# Patient Record
Sex: MALE | Race: WHITE | Employment: FULL TIME | URBAN - METROPOLITAN AREA
[De-identification: names, ages, dates, MRNs, and addresses within clinical notes are randomized per-mention and may not be internally consistent; named-entity substitution may affect disease eponyms.]

---

## 2018-02-07 ENCOUNTER — OFFICE VISIT (OUTPATIENT)
Dept: ORTHOPEDIC SURGERY | Age: 48
End: 2018-02-07

## 2018-02-07 VITALS
DIASTOLIC BLOOD PRESSURE: 98 MMHG | HEIGHT: 69 IN | SYSTOLIC BLOOD PRESSURE: 138 MMHG | WEIGHT: 210 LBS | BODY MASS INDEX: 31.1 KG/M2 | HEART RATE: 79 BPM

## 2018-02-07 DIAGNOSIS — M50.30 DDD (DEGENERATIVE DISC DISEASE), CERVICAL: ICD-10-CM

## 2018-02-07 DIAGNOSIS — M79.602 ARM PAIN, LEFT: Primary | ICD-10-CM

## 2018-02-07 DIAGNOSIS — M54.12 CERVICAL RADICULOPATHY: ICD-10-CM

## 2018-02-07 PROCEDURE — 99203 OFFICE O/P NEW LOW 30 MIN: CPT | Performed by: PHYSICIAN ASSISTANT

## 2018-02-07 RX ORDER — METHYLPREDNISOLONE 4 MG/1
TABLET ORAL
Qty: 1 KIT | Refills: 0 | Status: SHIPPED | OUTPATIENT
Start: 2018-02-07 | End: 2018-02-13

## 2018-02-07 RX ORDER — TIZANIDINE 4 MG/1
4 TABLET ORAL 3 TIMES DAILY
Qty: 30 TABLET | Refills: 0 | Status: SHIPPED | OUTPATIENT
Start: 2018-02-07

## 2018-02-07 NOTE — PROGRESS NOTES
by mouth every 6 hours as needed for Pain (Knows not to take prior to surgery). , Disp: , Rfl:   Allergies:  Review of patient's allergies indicates no known allergies. Social History:    reports that he has quit smoking. He has never used smokeless tobacco. He reports that he drinks alcohol. He reports that he does not use drugs. Family History:   Family History   Problem Relation Age of Onset    Adopted: Yes       REVIEW OF SYSTEMS: Full ROS noted & scanned   CONSTITUTIONAL: Denies unexplained weight loss, fevers, chills or fatigue  NEUROLOGICAL: Denies unsteady gait or progressive weakness  MUSCULOSKELETAL: Denies joint swelling or redness  PSYCHOLOGICAL: Denies anxiety, depression   SKIN: Denies skin changes, delayed healing, rash, itching   HEMATOLOGIC: Denies easy bleeding or bruising  ENDOCRINE: Denies excessive thirst, urination, heat/cold  RESPIRATORY: Denies current dyspnea, cough  GI: Denies nausea, vomiting, diarrhea   : Denies bowel or bladder issues       PHYSICAL EXAM:    Vitals: Blood pressure (!) 138/98, pulse 79, height 5' 9\" (1.753 m), weight 210 lb (95.3 kg). GENERAL EXAM:  · General Apparence: Patient is adequately groomed with no evidence of malnutrition. · Orientation: The patient is oriented to time, place and person. · Mood & Affect:The patient's mood and affect are appropriate   · Vascular: Examination reveals no swelling tenderness in upper or lower extremities. Good capillary refill  · Lymphatic: The lymphatic examination bilaterally reveals all areas to be without enlargement or induration  · Sensation: Sensation is intact without deficit  · Coordination/Balance: Good coordination     CERVICAL EXAMINATION:  · Inspection: Local inspection shows no step-off or bruising. Cervical alignment is normal.     · Palpation: There is some tenderness to palpation of the levator scapulae and rhomboids with mild muscle guarding. There is no step-off or paraspinal spasm.    · Range of

## 2018-02-21 ENCOUNTER — HOSPITAL ENCOUNTER (OUTPATIENT)
Dept: PHYSICAL THERAPY | Age: 48
Discharge: HOME | End: 2018-02-28
Admitting: ORTHOPAEDIC SURGERY

## 2018-02-21 NOTE — PLAN OF CARE
this time. Co-morbidities/Complexities (which will affect course of rehabilitation):   [x]None           Arthritic conditions   []Rheumatoid arthritis (M05.9)  []Osteoarthritis (M19.91)   Cardiovascular conditions   []Hypertension (I10)  []Hyperlipidemia (E78.5)  []Angina pectoris (I20)  []Atherosclerosis (I70)  []CVA Musculoskeletal conditions   []Disc pathology   []Congenital spine pathologies   []Prior surgical intervention  []Osteoporosis (M81.8)  []Osteopenia (M85.8)   Endocrine conditions   []Hypothyroid (E03.9)  []Hyperthyroid Gastrointestinal conditions   []Constipation (F67.83)   Metabolic conditions   []Morbid obesity (E66.01)  []Diabetes type 1(E10.65) or 2 (E11.65)   []Neuropathy (G60.9)     Pulmonary conditions   []Asthma (J45)  []Coughing   []COPD (J44.9)   Psychological Disorders  []Anxiety (F41.9)  []Depression (F32.9)   []Other:   []Other:          Barriers to/and or personal factors that will affect rehab potential:              []Age  []Sex   []Smoker              []Motivation/Lack of Motivation                        []Co-Morbidities              []Cognitive Function, education/learning barriers              []Environmental, home barriers              [x]profession/work barriers  []past PT/medical experience  []other:  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 [] Falls Risk assessed and Patient requires intervention due to being higher risk   TUG score (>12s at risk):     [] Falls education provided, including       G-Codes:  PT G-Codes  Functional Assessment Tool Used: NDI  Score: 8%  Functional Limitation: Changing and maintaining body position  Changing and Maintaining Body Position Current Status ():  At least 1 percent but less than 20 percent impaired, limited or restricted  Changing and Maintaining Body Position Goal Status (): 0 percent impaired, limited or restricted    ASSESSMENT: Patient demonstrates  decreased motion and strength in his cervical and thoracic spine, effecting his C7 dermatome and myotome, limiting his ability to complete ADLs and work without pain. Will benefit from skilled PT to address limitations. Functional Impairments:     [x]Noted cervical/thoracic/GHJ joint hypomobility   []Noted cervical/thoracic/GHJ joint hypermobility   [x]Decreased cervical/UE functional ROM   []Noted Headache pain aggravated by neck movements with/without dizziness   []Abnormal reflexes/sensation/myotomal/dermatomal deficits   [x]Decreased DCF control or ability to hold head up   [x]Decreased RC/scapular/core strength and neuromuscular control    [x]Decreased UE functional strength   []other:      Functional Activity Limitations (from functional questionnaire and intake)   []Reduced ability to tolerate prolonged functional positions   []Reduced ability or difficulty with changes of positions or transfers between positions   [x]Reduced ability to maintain good posture and demonstrate good body mechanics with sitting, bending, and lifting   [] Reduced ability or tolerance with driving and/or computer work   [x]Reduced ability to perform lifting, reaching, carrying tasks   []Reduced ability to concentrate   [x]Reduced ability to sleep    []Reduced ability to tolerate any impact through UE or spine   []Reduced ability to ambulate prolonged functional periods/distances   []other:    Participation Restrictions   []Reduced participation in self care activities   []Reduced participation in home management activities   [x]Reduced participation in work activities   []Reduced participation in social activities. []Reduced participation in sport/recreational activities.     Classification/Subgrouping:   []signs/symptoms consistent with neck pain with mobility deficits     []signs/symptoms consistent with neck pain with movement coordinated impairments    []signs/symptoms consistent with neck pain with radiating pain    []signs/symptoms consistent with neck pain with headaches (cervicogenic)    [x]Signs/symptoms consistent with nerve root involvement including myotome & dermatome dysfunction   []sign/symptoms consistent with facet dysfunction of cervical and thoracic spine    []signs/symptoms consistent suggesting central cord compression/UMN syndromes   []signs/symptoms consistent with discogenic cervical pain   []signs/symptoms consistent with rib dysfunction   []signs/symptoms consistent with postural dysfunction   []signs/symptoms consistent with shoulder pathology    []signs/symptoms consistent with post-surgical status including decreased ROM, strength and function.    [x]signs/symptoms consistent with pathology which may benefit from Dry Needling   []signs/symptoms which may limit the use of advanced manual therapy techniques: (Elevated CV risk profile, recent trauma, intolerance to end range positions, prior TIA, visual issues, UE neurological compromise )     Prognosis/Rehab Potential:      []Excellent   []Good    [x]Fair   []Poor    Tolerance of evaluation/treatment:    []Excellent   []Good    [x]Fair   []Poor    Physical Therapy Evaluation Complexity Justification  [x] A history of present problem with:  [x] no personal factors and/or comorbidities that impact the plan of care;  []1-2 personal factors and/or comorbidities that impact the plan of care  []3 personal factors and/or comorbidities that impact the plan of care  [x] An examination of body systems using standardized tests and measures addressing any of the following: body structures and functions (impairments), activity limitations, and/or participation restrictions;:  [x] a total of 1-2 or more elements   [] a total of 3 or more elements   [] a total of 4 or more elements   [x] A clinical presentation with:  [x] stable and/or uncomplicated characteristics   [] evolving clinical presentation with changing

## 2018-02-21 NOTE — FLOWSHEET NOTE
improving soft tissue extensibility and allowing for proper ROM for normal function with self care, reaching, carrying, lifting, house/yardwork, driving/computer work    Modalities:  PM/CP x 15' to cervical and thoracic spine  Charges:  Timed Code Treatment Minutes: 27'   Total Treatment Minutes: 54'     [x] EVAL (LOW) 13662 (typically 20 minutes face-to-face)  [] EVAL (MOD) 61122 (typically 30 minutes face-to-face)  [] EVAL (HIGH) 31282 (typically 45 minutes face-to-face)  [] RE-EVAL     [x] FT(94860) x  1   [] IONTO  [] NMR (73149) x      [] VASO  [x] Manual (12165) x  1    [] Other:  [] TA x       [] Mech Traction (38881)  [] ES(attended) (09092)      [x] ES (un) (31671):     GOALS:  Short Term Goals: To be achieved in: 2 weeks  1. Independent in HEP and progression per patient tolerance, in order to prevent re-injury. 2. Patient will have a decrease in pain to facilitate improvement in movement, function, and ADLs as indicated by Functional Deficits.     Long Term Goals: To be achieved in: 6 weeks  1. Disability index score of 2% or less for the NDI to assist with reaching prior level of function. 2. Patient will demonstrate painfree AROM WFL of cervical/thoracic spine to allow for proper joint functioning as indicated by patients Functional Deficits. 3. Patient will demonstrate an increase in postural awareness and control and activation of  Deep cervical stabilizers to allow for proper functional mobility as indicated by patients Functional Deficits. 4. Patient will be able to sleep through the night without increased symptoms or restriction. 5. Patient will have an absence of radiating pain down his arm.(patient specific functional goal)      Progression Towards Functional goals:  [] Patient is progressing as expected towards functional goals listed. [] Progression is slowed due to complexities listed. [] Progression has been slowed due to co-morbidities.   [x] Plan just implemented, too soon to

## 2018-02-26 ENCOUNTER — HOSPITAL ENCOUNTER (OUTPATIENT)
Dept: PHYSICAL THERAPY | Age: 48
Discharge: HOME | End: 2018-02-27
Admitting: ORTHOPAEDIC SURGERY

## 2018-02-26 NOTE — FLOWSHEET NOTE
[facility], [address] [place] [address] [place]  Phone: [phone]  Fax [phone]      Physical Therapy Daily Treatment Note  Date:  2018    Patient Name:  Miya Turner    :  1970  MRN: [id number]  Restrictions/Precautions:    Medical/Treatment Diagnosis Information:  · Diagnosis: Cervical DDD (M50.30), cervical radiculopathy (M54.12)  · Treatment Diagnosis: Neck pain (Z71.9)  Insurance/Certification information:  PT Insurance Information: [place]  Physician Information:  Referring Practitioner: Danny Olguin of care signed (Y/N):     Date of Patient follow up with Physician:     G-Code (if applicable):      Date G-Code Applied:    PT G-Codes  Functional Assessment Tool Used: NDI  Score: 8%  Functional Limitation: Changing and maintaining body position  Changing and Maintaining Body Position Current Status (): At least 1 percent but less than 20 percent impaired, limited or restricted  Changing and Maintaining Body Position Goal Status (): 0 percent impaired, limited or restricted    Progress Note: [x]  Yes  []  No  Next due by: Visit #10      Latex Allergy:  [x]NO      []YES  Preferred Language for Healthcare:   [x]English       []other:    Visit # Insurance Allowable Requires auth   2 30    [x]no        []yes:     Pain level:  210     SUBJECTIVE:  Patient reports his neck and shoulder seem to be a little better. Pain comes and goes but he feels that the exercises are helping.      OBJECTIVE:   Observation: decreased mobility throughout entire T spine  Test measurements:  NT this date    RESTRICTIONS/PRECAUTIONS: None    Exercises/Interventions:   Therapeutic Ex Sets/sec Reps Notes   HEP   Supine chin tucks 3\" 20x Cueing needed   Upper trap stretch 30\" 3 left only HEP   Levator stretch 30\" 3 left only    Doorway pec stretch  30\"  3 HEP   No $ 3\" 20x HEP                           Finisher Ladders  Finisher

## 2018-03-01 ENCOUNTER — HOSPITAL ENCOUNTER (OUTPATIENT)
Dept: PHYSICAL THERAPY | Age: 48
Discharge: HOME | End: 2018-03-31
Attending: ORTHOPAEDIC SURGERY | Admitting: ORTHOPAEDIC SURGERY

## 2018-03-05 ENCOUNTER — HOSPITAL ENCOUNTER (OUTPATIENT)
Dept: PHYSICAL THERAPY | Age: 48
Discharge: HOME | End: 2018-03-06
Admitting: ORTHOPAEDIC SURGERY

## 2018-03-05 NOTE — FLOWSHEET NOTE
[facility], [address] [place]  Phone: [phone]  Fax [phone]      Physical Therapy Daily Treatment Note  Date:  3/5/2018    Patient Name:  Jerzy Drake    :  1970  MRN: [id number]  Restrictions/Precautions:    Medical/Treatment Diagnosis Information:  · Diagnosis: Cervical DDD (M50.30), cervical radiculopathy (M54.12)  · Treatment Diagnosis: Neck pain (K67.6)  Insurance/Certification information:  PT Insurance Information: [place]  Physician Information:  Referring Practitioner: Modesta Maravilla of care signed (Y/N):     Date of Patient follow up with Physician:     G-Code (if applicable):      Date G-Code Applied:    PT G-Codes  Functional Assessment Tool Used: NDI  Score: 8%  Functional Limitation: Changing and maintaining body position  Changing and Maintaining Body Position Current Status (): At least 1 percent but less than 20 percent impaired, limited or restricted  Changing and Maintaining Body Position Goal Status (): 0 percent impaired, limited or restricted    Progress Note: [x]  Yes  []  No  Next due by: Visit #10      Latex Allergy:  [x]NO      []YES  Preferred Language for Healthcare:   [x]English       []other:    Visit # Insurance Allowable Requires auth   4 30    [x]no        []yes:     Pain level:  0/10     SUBJECTIVE:  Patient states his neck and arm continue to be pain free. Has been doing exercises.      OBJECTIVE:   Observation: Stiff T-spine  Test measurements:  NT this date    RESTRICTIONS/PRECAUTIONS: None    Exercises/Interventions:   Therapeutic Ex Sets/sec Reps Notes   HEP   Supine chin tucks 3\" 15x Cueing needed   Supine chick tuck with lift 1 10    Supine chin tuck with BUE flex 1 10    HEP   HEP   HEP   No $ 3\" 20x HEP         Chair t-spine ext 10x 5\"                Finisher Ladders  Finisher 1/2 arc 3\" 15x ea    Matt stretch upper back 20\" 5 Provided manual therapy to mobilize soft tissue/joints of cervical/CT, scapular GHJ and UE for the purpose of decreasing headache, modulating pain, promoting relaxation,  increasing ROM, reducing/eliminating soft tissue swelling/inflammation/restriction, improving soft tissue extensibility and allowing for proper ROM for normal function with self care, reaching, carrying, lifting, house/yardwork, driving/computer work    Modalities:  PM/CP x 15' to cervical and thoracic spine  Charges:  Timed Code Treatment Minutes: 39'   Total Treatment Minutes: 61'     [] EVAL (LOW) 91602 (typically 20 minutes face-to-face)  [] EVAL (MOD) 28652 (typically 30 minutes face-to-face)  [] EVAL (HIGH) 08033 (typically 45 minutes face-to-face)  [] RE-EVAL     [x] CW(10127) x  1   [] IONTO  [] NMR (82503) x      [] VASO  [x] Manual (07443) x  1    [] Other:  [] TA x       [x] Mech Traction (07519)  [] ES(attended) (74808)      [x] ES (un) (78535):     GOALS:  Short Term Goals: To be achieved in: 2 weeks  1. Independent in HEP and progression per patient tolerance, in order to prevent re-injury. 2. Patient will have a decrease in pain to facilitate improvement in movement, function, and ADLs as indicated by Functional Deficits.     Long Term Goals: To be achieved in: 6 weeks  1. Disability index score of 2% or less for the NDI to assist with reaching prior level of function. 2. Patient will demonstrate painfree AROM WFL of cervical/thoracic spine to allow for proper joint functioning as indicated by patients Functional Deficits. 3. Patient will demonstrate an increase in postural awareness and control and activation of  Deep cervical stabilizers to allow for proper functional mobility as indicated by patients Functional Deficits. 4. Patient will be able to sleep through the night without increased symptoms or restriction.    5. Patient will have an absence of radiating pain down his arm.(patient specific functional goal)

## 2018-03-07 ENCOUNTER — HOSPITAL ENCOUNTER (OUTPATIENT)
Dept: PHYSICAL THERAPY | Age: 48
Discharge: HOME | End: 2018-03-08
Admitting: ORTHOPAEDIC SURGERY

## 2018-03-07 NOTE — FLOWSHEET NOTE
[facility], [address]  Phone: [phone]  Fax [phone]      Physical Therapy Daily Treatment Note  Date:  3/7/2018    Patient Name:  Elizabeth Avilez    :  1970  MRN: [id number]  Restrictions/Precautions:    Medical/Treatment Diagnosis Information:  · Diagnosis: Cervical DDD (M50.30), cervical radiculopathy (M54.12)  · Treatment Diagnosis: Neck pain (H02.2)  Insurance/Certification information:  PT Insurance Information: [place]  Physician Information:  Referring Practitioner: Chiara Roles of care signed (Y/N):     Date of Patient follow up with Physician:     G-Code (if applicable):      Date G-Code Applied:    PT G-Codes  Functional Assessment Tool Used: NDI  Score: 8%  Functional Limitation: Changing and maintaining body position  Changing and Maintaining Body Position Current Status (): At least 1 percent but less than 20 percent impaired, limited or restricted  Changing and Maintaining Body Position Goal Status (): 0 percent impaired, limited or restricted    Progress Note: [x]  Yes  []  No  Next due by: Visit #10      Latex Allergy:  [x]NO      []YES  Preferred Language for Healthcare:   [x]English       []other:    Visit # Insurance Allowable Requires auth   5 30    [x]no        []yes:     Pain level:  0/10     SUBJECTIVE:  Patient states he feels a little stiff, but still no pain in his arm or shoulder blade.      OBJECTIVE:   Observation: Stiff T-spine  Test measurements:  NT this date    RESTRICTIONS/PRECAUTIONS: None    Exercises/Interventions:   Therapeutic Ex Sets/sec Reps Notes   HEP   Supine chin tucks 3\" 15x Cueing needed   Supine chick tuck with lift 1 10       HEP   HEP   HEP   No $ 3\" 20x HEP         Chair t-spine ext 10x 5\"                Finisher Ladders  Finisher 1/2 arc 3\" 15x ea    Matt stretch upper back 20\" 5          TB rows  TB ext 2  2 10  [place] cervical/CT, scapular GHJ and UE for the purpose of decreasing headache, modulating pain, promoting relaxation,  increasing ROM, reducing/eliminating soft tissue swelling/inflammation/restriction, improving soft tissue extensibility and allowing for proper ROM for normal function with self care, reaching, carrying, lifting, house/yardwork, driving/computer work    Modalities:  PM/CP x 15' to cervical and thoracic spine  Charges:  Timed Code Treatment Minutes: 38'   Total Treatment Minutes: 48'     [] EVAL (LOW) 15515 (typically 20 minutes face-to-face)  [] EVAL (MOD) 74292 (typically 30 minutes face-to-face)  [] EVAL (HIGH) 88146 (typically 45 minutes face-to-face)  [] RE-EVAL     [x] QK(06651) x  2   [] IONTO  [] NMR (01300) x      [] VASO  [x] Manual (31536) x  1    [] Other:  [] TA x       [] Mech Traction (83062)  [] ES(attended) (35308)      [x] ES (un) (17070):     GOALS:  Short Term Goals: To be achieved in: 2 weeks  1. Independent in HEP and progression per patient tolerance, in order to prevent re-injury. 2. Patient will have a decrease in pain to facilitate improvement in movement, function, and ADLs as indicated by Functional Deficits.     Long Term Goals: To be achieved in: 6 weeks  1. Disability index score of 2% or less for the NDI to assist with reaching prior level of function. 2. Patient will demonstrate painfree AROM WFL of cervical/thoracic spine to allow for proper joint functioning as indicated by patients Functional Deficits. 3. Patient will demonstrate an increase in postural awareness and control and activation of  Deep cervical stabilizers to allow for proper functional mobility as indicated by patients Functional Deficits. 4. Patient will be able to sleep through the night without increased symptoms or restriction.    5. Patient will have an absence of radiating pain down his arm.(patient specific functional goal)      Progression Towards Functional goals:  [x] Patient is progressing

## 2018-03-12 ENCOUNTER — HOSPITAL ENCOUNTER (OUTPATIENT)
Dept: PHYSICAL THERAPY | Age: 48
Discharge: HOME | End: 2018-03-13
Admitting: ORTHOPAEDIC SURGERY

## 2018-03-12 NOTE — FLOWSHEET NOTE
[facility], [address] [place]  Phone: [phone]  Fax [phone]      Physical Therapy Daily Treatment Note  Date:  3/12/2018    Patient Name:  Sushma Lubin    :  1970  MRN: [id number]  Restrictions/Precautions:    Medical/Treatment Diagnosis Information:  · Diagnosis: Cervical DDD (M50.30), cervical radiculopathy (M54.12)  · Treatment Diagnosis: Neck pain (U06.8)  Insurance/Certification information:  PT Insurance Information: [place]  Physician Information:  Referring Practitioner: Fermin Nguyens of care signed (Y/N):     Date of Patient follow up with Physician:     G-Code (if applicable):      Date G-Code Applied:    PT G-Codes  Functional Assessment Tool Used: NDI  Score: 8%  Functional Limitation: Changing and maintaining body position  Changing and Maintaining Body Position Current Status (): At least 1 percent but less than 20 percent impaired, limited or restricted  Changing and Maintaining Body Position Goal Status (): 0 percent impaired, limited or restricted    Progress Note: [x]  Yes  []  No  Next due by: Visit #10      Latex Allergy:  [x]NO      []YES  Preferred Language for Healthcare:   [x]English       []other:    Visit # Insurance Allowable Requires auth   6 30    [x]no        []yes:     Pain level:  0/10     SUBJECTIVE:  Patient states he feels a little stiff, but still no pain in his arm or shoulder blade.      OBJECTIVE:   Observation: Stiff T-spine  Test measurements:  NT this date    RESTRICTIONS/PRECAUTIONS: None    Exercises/Interventions:   Therapeutic Ex Sets/sec Reps Notes   HEP   Supine chin tucks 3\" 15x Cueing needed   Supine chick tuck with lift 1 15    Supine chin tuck with BUE flex 1 10    HEP   HEP   HEP   No $ 3\" 20x HEP   Quadruped chin tuck with alternating arms 5\" 10x ea          Chair t-spine ext 10x 5\"                Finisher Ladders  Finisher 1/2 arc 3\" 15x ea 4# pain down his arm.(patient specific functional goal)  Met    Progression Towards Functional goals:  [x] Patient is progressing as expected towards functional goals listed. [] Progression is slowed due to complexities listed. [] Progression has been slowed due to co-morbidities. [] Plan just implemented, too soon to assess goals progression  [] Other:     ASSESSMENT:  Patient has demonstrated improvements in motion and postural strength since beginning PT. Has not had any pain in nearly 2 weeks. Will DC with HEP at this time.      Treatment/Activity Tolerance:  [x] Patient tolerated treatment well [] Patient limited by fatique  [] Patient limited by pain  [] Patient limited by other medical complications  [] Other:     Prognosis: [] Good [x] Fair  [] Poor    Patient Requires Follow-up: [] Yes  [x] No    PLAN:   [] Continue per plan of care [] Alter current plan (see comments)  [] Plan of care initiated [] Hold pending MD visit [x] Discharge    Electronically signed by: Domi Rand, PT, DPT [id number]

## 2018-04-01 ENCOUNTER — HOSPITAL ENCOUNTER (OUTPATIENT)
Dept: PHYSICAL THERAPY | Age: 48
Discharge: HOME | End: 2018-04-30
Attending: ORTHOPAEDIC SURGERY | Admitting: ORTHOPAEDIC SURGERY

## 2020-11-19 ENCOUNTER — HOSPITAL ENCOUNTER (EMERGENCY)
Age: 50
Discharge: HOME | End: 2020-11-19
Attending: EMERGENCY MEDICINE
Payer: COMMERCIAL

## 2020-11-19 VITALS
OXYGEN SATURATION: 97 % | SYSTOLIC BLOOD PRESSURE: 151 MMHG | DIASTOLIC BLOOD PRESSURE: 90 MMHG | BODY MASS INDEX: 31.25 KG/M2 | TEMPERATURE: 97.9 F | HEART RATE: 71 BPM | WEIGHT: 211 LBS | RESPIRATION RATE: 18 BRPM | HEIGHT: 69 IN

## 2020-11-19 PROCEDURE — 99284 EMERGENCY DEPT VISIT MOD MDM: CPT

## 2020-11-19 PROCEDURE — 6370000000 HC RX 637 (ALT 250 FOR IP): Performed by: EMERGENCY MEDICINE

## 2020-11-19 RX ORDER — TETRACAINE HYDROCHLORIDE 5 MG/ML
2 SOLUTION OPHTHALMIC ONCE
Status: COMPLETED | OUTPATIENT
Start: 2020-11-19 | End: 2020-11-19

## 2020-11-19 RX ORDER — LISINOPRIL 2.5 MG/1
2.5 TABLET ORAL DAILY
COMMUNITY

## 2020-11-19 RX ADMIN — TETRACAINE HYDROCHLORIDE 2 DROP: 5 SOLUTION OPHTHALMIC at 21:06

## 2020-11-19 ASSESSMENT — PAIN DESCRIPTION - PAIN TYPE: TYPE: ACUTE PAIN

## 2020-11-19 ASSESSMENT — VISUAL ACUITY
OS: 20/200
OD: 20/200
OU: 20/70

## 2020-11-19 ASSESSMENT — PAIN DESCRIPTION - LOCATION: LOCATION: EYE

## 2020-11-19 ASSESSMENT — PAIN SCALES - GENERAL: PAINLEVEL_OUTOF10: 1

## 2020-11-19 NOTE — LETTER
[facility]  ED  [address]  [address]  Phone: [phone]  Fax: [phone]               November 19, 2020    Patient: Caroline Tijerina   YOB: 1970   Date of Visit: 11/19/2020       To Whom It May Concern:    Caroline Tijerina was seen and treated in our emergency department on 11/19/2020. He may return to work on 11/23/20.       Sincerely,       Hollis Weaver DO         Signature:__________________________________
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
## 2020-11-20 NOTE — ED PROVIDER NOTES
CHIEF COMPLAINT  Eye Pain (Patient states 30 minutes PTA he got some battery acid/ corosion in Left eye, flushed with tap water)      HISTORY OF PRESENT ILLNESS  Trudy Carlton is a 51 y.o. male presents to the ED with left eye pain and redness, states about 30 minutes prior to arrival he was changing the battery in his wife's car, and the greenish color corrosive material on the top of the battery flipped up into his left eye, immediately began burning, he had tried to rinse it out as much possible with tap water, but when he told his wife about it she made him come to the ER, he states it is getting more red and irritated, no previous injury like this before, he reports he immediately took his contacts out, and wears glasses but does not have them with him, he states his vision seems as it would normally without his glasses, he does not think he scratched his eye or has any foreign bodies, he reports no recent illness, no fevers or coronavirus exposure that he is aware of, no sharp objects, no headache, no nausea or vomiting. No other complaints, modifying factors or associated symptoms. I have reviewed the following from the nursing documentation. History reviewed. No pertinent past medical history.   Past Surgical History:   Procedure Laterality Date    ANTERIOR CRUCIATE LIGAMENT REPAIR      HERNIA REPAIR      Inguinal    HYDROCELE EXCISION      NASAL SEPTUM SURGERY      TYMPANOSTOMY TUBE PLACEMENT      UMBILICAL HERNIA REPAIR N/A 09/03/2014     Family History   Adopted: Yes     Social History     Socioeconomic History    Marital status:      Spouse name: Not on file    Number of children: Not on file    Years of education: Not on file    Highest education level: Not on file   Occupational History    Not on file   Social Needs    Financial resource strain: Not on file    Food insecurity     Worry: Not on file     Inability: Not on file    Transportation needs     Medical: Not on file Non-medical: Not on file   Tobacco Use    Smoking status: Former Smoker    Smokeless tobacco: Never Used    Tobacco comment: Unable to give quit date   Substance and Sexual Activity    Alcohol use: Yes     Comment: occas    Drug use: No    Sexual activity: Not on file   Lifestyle    Physical activity     Days per week: Not on file     Minutes per session: Not on file    Stress: Not on file   Relationships    Social connections     Talks on phone: Not on file     Gets together: Not on file     Attends Church service: Not on file     Active member of club or organization: Not on file     Attends meetings of clubs or organizations: Not on file     Relationship status: Not on file    Intimate partner violence     Fear of current or ex partner: Not on file     Emotionally abused: Not on file     Physically abused: Not on file     Forced sexual activity: Not on file   Other Topics Concern    Not on file   Social History Narrative    Not on file     No current facility-administered medications for this encounter. Current Outpatient Medications   Medication Sig Dispense Refill    lisinopril (PRINIVIL;ZESTRIL) 2.5 MG tablet Take 2.5 mg by mouth daily      tiZANidine (ZANAFLEX) 4 MG tablet Take 1 tablet by mouth 3 times daily 30 tablet 0    ibuprofen (ADVIL;MOTRIN) 200 MG tablet Take 200 mg by mouth every 6 hours as needed for Pain (Knows not to take prior to surgery). No Known Allergies    REVIEW OF SYSTEMS  10 systems reviewed, pertinent positives per HPI otherwise noted to be negative. PHYSICAL EXAM  BP (!) 151/90   Pulse 71   Temp 97.9 °F (36.6 °C) (Oral)   Resp 18   Ht 5' 9\" (1.753 m)   Wt 211 lb (95.7 kg)   SpO2 97%   BMI 31.16 kg/m²   GENERAL APPEARANCE: Awake and alert. Cooperative. No acute distress  HEAD: Normocephalic. Atraumatic. EYES: PERRL. EOM's grossly intact.   Normal appearing right eye/eyelids, focused on left eye: Significant left eye conjunctival injection with slight chemosis laterally, there is some slight discharge, white sloughing in the inferior eyelid region and medial canthus, no clouding of the cornea, normal appearing iris/pupil, intraocular pressure tested and measured 11, then 10 in the left eye, pH measured by strips at bedside and coloration of the strip with moistening with patient's tears was between 7 and 8 prior to any irrigation, confirmed with nursing at bedside for color assurance, and similar color/between 7 and 8 about 30 minutes after irrigation as well, Woods lamp exam with fluorescein staining without any evidence of foreign bodies, eyelid everted, no evidence of corneal abrasion, no dendritic sign, no Judi sign   ENT: Mucous membranes are moist.  Airway patent, no stridor, no otorrhea or rhinorrhea  NECK: Supple. No rigidity, normal range of motion  HEART: RRR. No murmurs  LUNGS: Respirations nonlabored. Lungs are clear to auscultation bilaterally. ABDOMEN: Soft. Non-distended. Non-tender. No guarding or rebound. Normal bowel sounds. EXTREMITIES: No peripheral edema. Moves all extremities equally. All extremities neurovascularly intact. SKIN: Warm and dry. No acute rashes. NEUROLOGICAL: Alert and oriented. No gross facial drooping. Normal speech, normal gait, no ataxia  PSYCHIATRIC: Normal mood and affect. ED COURSE/MDM  Patient seen and evaluated. Old records reviewed. Labs and imaging reviewed and results discussed with patient.    56-year-old male with left eye chemical conjunctivitis, he had irrigated at home, I flushed at bedside with sterile saline and placed tetracaine drops which helped significantly, also nursing placed [facility] lens for further irrigation of another liter, and patient experienced significant improvement in symptoms, there was still still erythema of the conjunctiva but patient denied any concern for his visual acuity, explained we cannot fully evaluate if he did not have his glasses with him, but he was unable to bring them in during his ED visit, I spoke to Dr. [facility] ophthalmology who made the patient an appointment at 7:30 AM tomorrow morning for recheck. Explained the patient's history and physical exam findings, he reported that redness of the eye was a positive factor, and a white eye would be more concerning, discussed with the patient and he was given specifics/information on discharge instructions, given work excuse to assure that he would make it to the appointment, encouraged to use ibuprofen/Tylenol for pain, but return if any new or worsening symptoms, intraocular pressure was normal, pH was normal before and after irrigation, also encouraged follow-up with primary care about his blood pressure elevation, he was told not to wear his contacts until cleared by the eye doctor, strict return precautions given, all questions answered, will return if any worsening symptoms or new concerns, see AVS for further discharge information, patient verbalized understanding of plan, felt comfortable going home. Orders Placed This Encounter   Procedures    Visual acuity screening    Inpatient consult to Ophthalmology     Orders Placed This Encounter   Medications    tetracaine (TETRAVISC) 0.5 % ophthalmic solution 2 drop     ED Course as of Nov 20 0841   Thu Nov 19, 2020 2111 Perform visual acuity, 20/200 in the left eye, 20/200 in right eye, and 20/70 bilaterally, but he does not have his glasses with him, and had taken his contacts out at home.     [SY]   2230 I spoke to Dr. Herson Nunez ophthalmologist, he stated the patient could follow-up at 7:30 AM at the [facility] office tomorrow morning.    [SY]      ED Course User Index  [SY] Jarek Hernandez DO       CLINICAL IMPRESSION  1. Chemical conjunctivitis of left eye    2. Chemosis of left conjunctiva    3. Essential hypertension        Blood pressure (!) 151/90, pulse 71, temperature 97.9 °F (36.6 °C), temperature source Oral, resp.  rate 18, height 5' 9\" (1.753 m), weight 211 lb (95.7 kg), SpO2 97 %. DISPOSITION  Leatha Stubbs was discharged to home in stable condition.                    Giuliana Romo,   11/21/20 4140

## 2020-11-20 NOTE — ED NOTES
Bed: 22  Expected date:   Expected time:   Means of arrival:   Comments:     Nga Melchor RN  11/19/20 2032

## 2023-05-24 NOTE — H&P
[address]   Pre-operative History and Physical    Patient: Mega Dunlap  : 1970  Acct#:     HISTORY OF PRESENT ILLNESS:    Indications: Positive Cologuard    The patient is a 47 y.o. male with medical history of hypertension referred for positive Cologuard on 4/10/2023. Denies abdominal pain, nausea, vomiting, fever, chills, unintentional weight loss, constipation, diarrhea, hematochezia or melenic stools. No prior colonoscopy. No family history of colon cancer. Past Medical History:        Diagnosis Date    Hypertension       Past Surgical History:        Procedure Laterality Date    ANTERIOR CRUCIATE LIGAMENT REPAIR Right     HERNIA REPAIR      Inguinal    HYDROCELE EXCISION      NASAL SEPTUM SURGERY      TYMPANOSTOMY TUBE PLACEMENT      UMBILICAL HERNIA REPAIR N/A 2014      Medications Prior to Admission:   No current facility-administered medications on file prior to encounter. Current Outpatient Medications on File Prior to Encounter   Medication Sig Dispense Refill    AMLODIPINE BESYLATE PO Take by mouth daily      lisinopril (PRINIVIL;ZESTRIL) 2.5 MG tablet Take 2.5 mg by mouth daily      ibuprofen (ADVIL;MOTRIN) 200 MG tablet Take 200 mg by mouth every 6 hours as needed for Pain (Knows not to take prior to surgery). Allergies:  Patient has no known allergies.     Social History:   Social History     Socioeconomic History    Marital status:      Spouse name: Not on file    Number of children: Not on file    Years of education: Not on file    Highest education level: Not on file   Occupational History    Not on file   Tobacco Use    Smoking status: Former     Types: Cigarettes     Quit date:      Years since quitting: 10.3    Smokeless tobacco: Never    Tobacco comments:     Unable to give quit date   Vaping Use    Vaping Use: Never used   Substance and Sexual Activity    Alcohol use: Yes     Comment: 3 drinks per week    Drug use: No    Sexual

## 2023-05-25 ENCOUNTER — ANESTHESIA EVENT (OUTPATIENT)
Dept: ENDOSCOPY | Age: 53
End: 2023-05-25
Payer: COMMERCIAL

## 2023-05-26 ENCOUNTER — ANESTHESIA (OUTPATIENT)
Dept: ENDOSCOPY | Age: 53
End: 2023-05-26
Payer: COMMERCIAL

## 2023-05-26 ENCOUNTER — HOSPITAL ENCOUNTER (OUTPATIENT)
Age: 53
Setting detail: OUTPATIENT SURGERY
Discharge: HOME | End: 2023-05-26
Attending: INTERNAL MEDICINE | Admitting: INTERNAL MEDICINE
Payer: COMMERCIAL

## 2023-05-26 VITALS
HEIGHT: 69 IN | SYSTOLIC BLOOD PRESSURE: 128 MMHG | WEIGHT: 210 LBS | DIASTOLIC BLOOD PRESSURE: 73 MMHG | TEMPERATURE: 97.2 F | BODY MASS INDEX: 31.1 KG/M2 | OXYGEN SATURATION: 100 % | HEART RATE: 65 BPM | RESPIRATION RATE: 16 BRPM

## 2023-05-26 DIAGNOSIS — R19.5 POSITIVE COLORECTAL CANCER SCREENING USING COLOGUARD TEST: ICD-10-CM

## 2023-05-26 PROCEDURE — 3700000001 HC ADD 15 MINUTES (ANESTHESIA): Performed by: INTERNAL MEDICINE

## 2023-05-26 PROCEDURE — 3609010700 HC COLONOSCOPY POLYPECTOMY REMOVAL SNARE/STOMA: Performed by: INTERNAL MEDICINE

## 2023-05-26 PROCEDURE — 2500000003 HC RX 250 WO HCPCS: Performed by: NURSE ANESTHETIST, CERTIFIED REGISTERED

## 2023-05-26 PROCEDURE — 7100000010 HC PHASE II RECOVERY - FIRST 15 MIN: Performed by: INTERNAL MEDICINE

## 2023-05-26 PROCEDURE — 2580000003 HC RX 258: Performed by: ANESTHESIOLOGY

## 2023-05-26 PROCEDURE — 3700000000 HC ANESTHESIA ATTENDED CARE: Performed by: INTERNAL MEDICINE

## 2023-05-26 PROCEDURE — 6370000000 HC RX 637 (ALT 250 FOR IP): Performed by: INTERNAL MEDICINE

## 2023-05-26 PROCEDURE — 7100000011 HC PHASE II RECOVERY - ADDTL 15 MIN: Performed by: INTERNAL MEDICINE

## 2023-05-26 PROCEDURE — 2709999900 HC NON-CHARGEABLE SUPPLY: Performed by: INTERNAL MEDICINE

## 2023-05-26 PROCEDURE — 6360000002 HC RX W HCPCS: Performed by: NURSE ANESTHETIST, CERTIFIED REGISTERED

## 2023-05-26 PROCEDURE — 88305 TISSUE EXAM BY PATHOLOGIST: CPT

## 2023-05-26 RX ORDER — PROPOFOL 10 MG/ML
INJECTION, EMULSION INTRAVENOUS PRN
Status: DISCONTINUED | OUTPATIENT
Start: 2023-05-26 | End: 2023-05-26 | Stop reason: SDUPTHER

## 2023-05-26 RX ORDER — SIMETHICONE 20 MG/.3ML
EMULSION ORAL PRN
Status: DISCONTINUED | OUTPATIENT
Start: 2023-05-26 | End: 2023-05-26 | Stop reason: ALTCHOICE

## 2023-05-26 RX ORDER — SODIUM CHLORIDE 9 MG/ML
INJECTION, SOLUTION INTRAVENOUS PRN
Status: DISCONTINUED | OUTPATIENT
Start: 2023-05-26 | End: 2023-05-26 | Stop reason: HOSPADM

## 2023-05-26 RX ORDER — LIDOCAINE HYDROCHLORIDE 20 MG/ML
INJECTION, SOLUTION EPIDURAL; INFILTRATION; INTRACAUDAL; PERINEURAL PRN
Status: DISCONTINUED | OUTPATIENT
Start: 2023-05-26 | End: 2023-05-26 | Stop reason: SDUPTHER

## 2023-05-26 RX ORDER — LIDOCAINE HYDROCHLORIDE 10 MG/ML
0.3 INJECTION, SOLUTION EPIDURAL; INFILTRATION; INTRACAUDAL; PERINEURAL
Status: DISCONTINUED | OUTPATIENT
Start: 2023-05-26 | End: 2023-05-26 | Stop reason: HOSPADM

## 2023-05-26 RX ORDER — SODIUM CHLORIDE 0.9 % (FLUSH) 0.9 %
5-40 SYRINGE (ML) INJECTION EVERY 12 HOURS SCHEDULED
Status: DISCONTINUED | OUTPATIENT
Start: 2023-05-26 | End: 2023-05-26 | Stop reason: HOSPADM

## 2023-05-26 RX ORDER — SODIUM CHLORIDE, SODIUM LACTATE, POTASSIUM CHLORIDE, CALCIUM CHLORIDE 600; 310; 30; 20 MG/100ML; MG/100ML; MG/100ML; MG/100ML
INJECTION, SOLUTION INTRAVENOUS CONTINUOUS
Status: DISCONTINUED | OUTPATIENT
Start: 2023-05-26 | End: 2023-05-26 | Stop reason: HOSPADM

## 2023-05-26 RX ORDER — SODIUM CHLORIDE 0.9 % (FLUSH) 0.9 %
5-40 SYRINGE (ML) INJECTION PRN
Status: DISCONTINUED | OUTPATIENT
Start: 2023-05-26 | End: 2023-05-26 | Stop reason: HOSPADM

## 2023-05-26 RX ORDER — GLYCOPYRROLATE 0.2 MG/ML
INJECTION INTRAMUSCULAR; INTRAVENOUS PRN
Status: DISCONTINUED | OUTPATIENT
Start: 2023-05-26 | End: 2023-05-26 | Stop reason: SDUPTHER

## 2023-05-26 RX ADMIN — PROPOFOL 50 MG: 10 INJECTION, EMULSION INTRAVENOUS at 08:41

## 2023-05-26 RX ADMIN — LIDOCAINE HYDROCHLORIDE 50 MG: 20 INJECTION, SOLUTION EPIDURAL; INFILTRATION; INTRACAUDAL; PERINEURAL at 08:24

## 2023-05-26 RX ADMIN — PROPOFOL 100 MG: 10 INJECTION, EMULSION INTRAVENOUS at 08:30

## 2023-05-26 RX ADMIN — PROPOFOL 100 MG: 10 INJECTION, EMULSION INTRAVENOUS at 08:24

## 2023-05-26 RX ADMIN — PROPOFOL 50 MG: 10 INJECTION, EMULSION INTRAVENOUS at 08:37

## 2023-05-26 RX ADMIN — SODIUM CHLORIDE, SODIUM LACTATE, POTASSIUM CHLORIDE, AND CALCIUM CHLORIDE: .6; .31; .03; .02 INJECTION, SOLUTION INTRAVENOUS at 08:16

## 2023-05-26 RX ADMIN — PROPOFOL 50 MG: 10 INJECTION, EMULSION INTRAVENOUS at 08:35

## 2023-05-26 RX ADMIN — GLYCOPYRROLATE 0.2 MG: 0.2 INJECTION, SOLUTION INTRAMUSCULAR; INTRAVENOUS at 08:32

## 2023-05-26 RX ADMIN — PROPOFOL 50 MG: 10 INJECTION, EMULSION INTRAVENOUS at 08:46

## 2023-05-26 ASSESSMENT — PAIN SCALES - GENERAL
PAINLEVEL_OUTOF10: 0

## 2023-05-26 ASSESSMENT — PAIN - FUNCTIONAL ASSESSMENT: PAIN_FUNCTIONAL_ASSESSMENT: 0-10

## 2023-05-26 NOTE — ANESTHESIA PRE PROCEDURE
Department of Anesthesiology  Preprocedure Note       Name:  Adolph Lopez   Age:  47 y.o.  :  1970                                          MRN:  [id number]         Date:  2023      Surgeon: Dian Pemberton):  Kenn Huose MD    Procedure: Procedure(s):  COLONOSCOPY    Medications prior to admission:   Prior to Admission medications    Medication Sig Start Date End Date Taking? Authorizing Provider   AMLODIPINE BESYLATE PO Take by mouth daily   Yes Historical Provider, MD   lisinopril (PRINIVIL;ZESTRIL) 2.5 MG tablet Take 2.5 mg by mouth daily    Historical Provider, MD   ibuprofen (ADVIL;MOTRIN) 200 MG tablet Take 200 mg by mouth every 6 hours as needed for Pain (Knows not to take prior to surgery). Historical Provider, MD       Current medications:    Current Facility-Administered Medications   Medication Dose Route Frequency Provider Last Rate Last Admin    lidocaine PF 1 % injection 0.3 mL  0.3 mL IntraDERmal Once PRN Akanksha Singleton MD        lactated ringers IV soln infusion   IntraVENous Continuous Akanksha Singleton MD        sodium chloride flush 0.9 % injection 5-40 mL  5-40 mL IntraVENous 2 times per day Akanksha Singleton MD        sodium chloride flush 0.9 % injection 5-40 mL  5-40 mL IntraVENous PRN Akanksha Singleton MD        0.9 % sodium chloride infusion   IntraVENous PRN Akanksha Singleton MD           Allergies:  No Known Allergies    Problem List:  There is no problem list on file for this patient.       Past Medical History:        Diagnosis Date    Hypertension        Past Surgical History:        Procedure Laterality Date    ANTERIOR CRUCIATE LIGAMENT REPAIR Right     HERNIA REPAIR      Inguinal    HYDROCELE EXCISION      NASAL SEPTUM SURGERY      TYMPANOSTOMY TUBE PLACEMENT      UMBILICAL HERNIA REPAIR N/A 2014       Social History:    Social History     Tobacco Use    Smoking status: Former     Types: Cigarettes     Quit date:      Years since quitting: 10.4    Smokeless
tobacco: Never    Tobacco comments:     Unable to give quit date   Substance Use Topics    Alcohol use: Yes     Comment: 3 drinks per week                                Counseling given: Not Answered  Tobacco comments: Unable to give quit date      Vital Signs (Current):   Vitals:    05/22/23 1051   Weight: 210 lb (95.3 kg)   Height: 5' 9\" (1.753 m)                                              BP Readings from Last 3 Encounters:   11/19/20 (!) 151/90   02/07/18 (!) 138/98   09/03/14 (!) 130/91       NPO Status:                                                                                 BMI:   Wt Readings from Last 3 Encounters:   05/22/23 210 lb (95.3 kg)   11/19/20 211 lb (95.7 kg)   02/07/18 210 lb (95.3 kg)     Body mass index is 31.01 kg/m². CBC: No results found for: WBC, RBC, HGB, HCT, MCV, RDW, PLT    CMP: No results found for: NA, K, CL, CO2, BUN, CREATININE, GFRAA, AGRATIO, LABGLOM, GLUCOSE, GLU, PROT, CALCIUM, BILITOT, ALKPHOS, AST, ALT    POC Tests: No results for input(s): POCGLU, POCNA, POCK, POCCL, POCBUN, POCHEMO, POCHCT in the last 72 hours.     Coags: No results found for: PROTIME, INR, APTT    HCG (If Applicable): No results found for: PREGTESTUR, PREGSERUM, HCG, HCGQUANT     ABGs: No results found for: PHART, PO2ART, QWA5IZQ, NNL6FFW, BEART, F8IGGGJO     Type & Screen (If Applicable):  No results found for: LABABO, LABRH    Drug/Infectious Status (If Applicable):  No results found for: HIV, HEPCAB    COVID-19 Screening (If Applicable): No results found for: COVID19        Anesthesia Evaluation  Patient summary reviewed and Nursing notes reviewed no history of anesthetic complications:   Airway:           Dental:          Pulmonary:Negative Pulmonary ROS                              Cardiovascular:    (+) hypertension:,                   Neuro/Psych:   Negative Neuro/Psych ROS              GI/Hepatic/Renal: Neg GI/Hepatic/Renal ROS       (-) GERD, liver disease and no renal disease

## 2023-05-26 NOTE — PROGRESS NOTES
Obstructive Sleep Apnea (NEELAM) Screening     Patient:  Ambar Ventura    YOB: 1970      Medical Record #:  [id number]                     Date:  5/22/2023     1. Are you a loud and/or regular snorer? [x]  Yes       [] No    2. Have you been observed to gasp or stop breathing during sleep? []  Yes       [x] No    3. Do you feel tired or groggy upon awakening or do you awaken with a headache?           []  Yes       [x] No    4. Are you often tired or fatigued during the wake time hours? []  Yes       [x] No    5. Do you fall asleep sitting, reading, watching TV or driving? []  Yes       [x] No    6. Do you often have problems with memory or concentration? []  Yes       [x] No    **If patient's score is ? 3 they are considered high risk for NEELAM. An Anesthesia provider will evaluate the patient and develop a plan of care the day of surgery. Note:  If the patient's BMI is more than 35 kg m¯² , has neck circumference > 40 cm, and/or high blood pressure the risk is greater (© American Sleep Apnea Association, 2006).
Pt fully awake. ASH RUIZ @ bedside w/ pt and family discussing procedure
Pt received from ENDO.  Pt VSS
infection, change your sheets the night before surgery. Also, shower the night before and morning of surgery using an antibacterial soap or as you have been instructed. Notify your Surgeon if you develop any illness between now and time of surgery. Cough, cold, fever, sore throat, nausea, vomiting, etc.  Please notify your surgeon if you experience dizziness, shortness of breath or blurred vision between now & the time of your surgery  To provide excellent care visitors will be limited to two per room at any given time. No visitors under the age of 15. If you use oxygen and have a portable tank please bring it with you the DOS  For your convenience [address] [facility] has a pharmacy on site to fill your prescriptions. *Please call pre admission testing if you have any further questions             Julia Morgan      [phone]                            Address: [facility]     When you pull into the hospital and are looking at the main hospital entrance, turn right. We are a tan building to the right of the main entrance. [address] over the door.   .                                                        Revision History

## 2023-05-26 NOTE — DISCHARGE INSTRUCTIONS
PATIENT INSTRUCTIONS  POST-SEDATION    Adolph Lopez          IMMEDIATELY FOLLOWING PROCEDURE:    Do not drive or operate machinery for the first twenty four hours after surgery. Do not make any important decisions for twenty four hours after surgery or while taking narcotic pain medications or sedatives. You should NOT BE LEFT UNATTENDED OR ALONE. A responsible adult should be with you for the rest of the day of your procedure and also during the night for your protection and safety. You may experience some light headedness. Rest at home with activity as tolerated. You may not need to go to bed, but it is important to rest for the next 24 hours. You should not engage in athletic sports such as basketball, volleyball, jogging, skating, or activities requiring refined motor skills for 24 hours. If you develop intractable nausea and vomiting or a severe headache please notify your doctor immediately. You are not expected to have any fever, but if you feel warm, take your temperature. If you have a fever 101 degrees or higher, call your doctor. If you have had an Endoscopy:   *Eat lightly for your first meal and gradually resume your normal / prescribed diet. *If you have had a colonoscopy, do not expect a normal bowel movement for approximately three days due to the cleansing of the large intestine prior to colonoscopy. ONCE YOU ARE HOME, IF YOU SHOULD HAVE:  Difficulty in breathing, persistent nausea or vomiting, bleeding you feel is excessive, or pain that is unusual, increased abdominal bloating, or any swelling, fever / chills, call your physician. If you cannot contact your physician, but feel that your signs and symptoms need a physician's attention, go to the Emergency Department. FOLLOW-UP:    Please follow up with @PCP@ as scheduled or needed. Dr. Kenn House MD will call you with the biopsy findings. Call Dr. Kenn House MD if there are any GI concerns.

## 2023-05-26 NOTE — OP NOTE
[facility] [address],  [address]  [address] [place] [address], [address] [place] [address]  Phone: [phone]   LLQ:[phone]  [address]  [address] [place] [address]  Phone: [phone]   FBR:[phone]      Colonoscopy Procedure Note    Patient: Pete Johnson  : 1970    Procedure: Colonoscopy with polypectomy (cold snare)    Date:  2023     Endoscopist:  Shane Carroll MD    Referring Physician:  Leonel Nava MD    Preoperative Diagnosis:  Positive colorectal cancer screening using Cologuard test [R19.5]    Postoperative Diagnosis: Sigmoid diverticulosis, transverse colon polyps, sigmoid polyps, internal hemorrhoids    Anesthesia: Anesthesia: MAC  Sedation: Propofol per anesthesia  Start Time: 08:28  Stop Time: 08:48  ASA Class: 1  Mallampati: II (soft palate, uvula, fauces visible)    Indications: This is a 47y.o. year old male with medical history of hypertension referred for positive Cologuard on 4/10/2023. Procedure Details  Informed consent was obtained for the procedure, including sedation. Risks of perforation, hemorrhage, adverse drug reaction and aspiration were discussed. The patient was placed in the left lateral decubitus position. Based on the pre-procedure assessment, including review of the patient's medical history, medications, allergies, and review of systems, he had been deemed to be an appropriate candidate for above IV sedation; he was therefore sedated and monitored continuously with ECG tracing, pulse oximetry, blood pressure monitoring, and direct observations. Rectal examination was performed. There were no external hemorrhoids, fissures or skin tags. The colonoscope was inserted into the rectum and advanced under direct vision to the terminal ileum. The right colon was examined twice as this increases polyp detection especially if other right colon polyps, older age, male, or rosas syndrome. The quality of the colonic preparation was good.   A careful inspection was made

## 2023-05-26 NOTE — ANESTHESIA POSTPROCEDURE EVALUATION
Department of Anesthesiology  Postprocedure Note    Patient: Tiana Hernandez  MRN: [id number]  YOB: 1970  Date of evaluation: 5/26/2023      Procedure Summary     Date: 05/26/23 Room / Location: [facility] [address]    Anesthesia Start: 7901 Anesthesia Stop: 8740    Procedure: COLONOSCOPY POLYPECTOMY REMOVAL SNARE Diagnosis:       Positive colorectal cancer screening using Cologuard test      (POSITIVE COLOGUARD)    Surgeons: Robbie Bland MD Responsible Provider: Davey Conley MD    Anesthesia Type: MAC ASA Status: 2          Anesthesia Type: No value filed.     Wes Phase I: Wes Score: 10    Wes Phase II: Wes Score: 10      Anesthesia Post Evaluation    Patient location during evaluation: PACU  Level of consciousness: awake  Airway patency: patent  Nausea & Vomiting: no nausea  Complications: no  Cardiovascular status: blood pressure returned to baseline  Respiratory status: acceptable  Hydration status: euvolemic

## (undated) DEVICE — ENDOSCOPIC KIT 2 12 FT OP4 DE2 GWN SYR

## (undated) DEVICE — CANNULA NSL AD TBNG L7FT PVC STR NONFLARED PRNG O2 DEL W STD

## (undated) DEVICE — SNARE ENDOSCP L240CM SHTH DIA24MM LOOP W10MM POLYP RND REINF

## (undated) DEVICE — TRAP SPEC POLYPR SGL CHMBR FN MESH SCRN

## (undated) DEVICE — ELECTRODE,ECG,STRESS,FOAM,3PK: Brand: MEDLINE